# Patient Record
Sex: MALE | Race: WHITE | NOT HISPANIC OR LATINO | URBAN - METROPOLITAN AREA
[De-identification: names, ages, dates, MRNs, and addresses within clinical notes are randomized per-mention and may not be internally consistent; named-entity substitution may affect disease eponyms.]

---

## 2017-02-15 ENCOUNTER — INPATIENT (INPATIENT)
Facility: HOSPITAL | Age: 22
LOS: 3 days | Discharge: HOME | DRG: 440 | End: 2017-02-19
Attending: INTERNAL MEDICINE | Admitting: HOSPITALIST
Payer: COMMERCIAL

## 2017-02-15 VITALS — HEART RATE: 88 BPM | RESPIRATION RATE: 18 BRPM | OXYGEN SATURATION: 98 % | TEMPERATURE: 99 F

## 2017-02-15 DIAGNOSIS — K85.90 ACUTE PANCREATITIS WITHOUT NECROSIS OR INFECTION, UNSPECIFIED: ICD-10-CM

## 2017-02-15 DIAGNOSIS — Z90.49 ACQUIRED ABSENCE OF OTHER SPECIFIED PARTS OF DIGESTIVE TRACT: Chronic | ICD-10-CM

## 2017-02-15 LAB
ALBUMIN SERPL ELPH-MCNC: 4 G/DL — SIGNIFICANT CHANGE UP (ref 3.3–5)
ALP SERPL-CCNC: 178 U/L — HIGH (ref 40–120)
ALT FLD-CCNC: 457 U/L RC — HIGH (ref 10–45)
ANION GAP SERPL CALC-SCNC: 17 MMOL/L — SIGNIFICANT CHANGE UP (ref 5–17)
APPEARANCE UR: CLEAR — SIGNIFICANT CHANGE UP
APTT BLD: 33.3 SEC — SIGNIFICANT CHANGE UP (ref 27.5–37.4)
AST SERPL-CCNC: 182 U/L — HIGH (ref 10–40)
BACTERIA # UR AUTO: ABNORMAL /HPF
BASOPHILS # BLD AUTO: 0.1 K/UL — SIGNIFICANT CHANGE UP (ref 0–0.2)
BASOPHILS NFR BLD AUTO: 0.5 % — SIGNIFICANT CHANGE UP (ref 0–2)
BILIRUB SERPL-MCNC: 2.4 MG/DL — HIGH (ref 0.2–1.2)
BILIRUB UR-MCNC: NEGATIVE — SIGNIFICANT CHANGE UP
BLD GP AB SCN SERPL QL: NEGATIVE — SIGNIFICANT CHANGE UP
BUN SERPL-MCNC: 8 MG/DL — SIGNIFICANT CHANGE UP (ref 7–23)
CALCIUM SERPL-MCNC: 9.5 MG/DL — SIGNIFICANT CHANGE UP (ref 8.4–10.5)
CHLORIDE SERPL-SCNC: 96 MMOL/L — SIGNIFICANT CHANGE UP (ref 96–108)
CO2 SERPL-SCNC: 23 MMOL/L — SIGNIFICANT CHANGE UP (ref 22–31)
COLOR SPEC: YELLOW — SIGNIFICANT CHANGE UP
CREAT SERPL-MCNC: 0.82 MG/DL — SIGNIFICANT CHANGE UP (ref 0.5–1.3)
DIFF PNL FLD: NEGATIVE — SIGNIFICANT CHANGE UP
EOSINOPHIL # BLD AUTO: 0.3 K/UL — SIGNIFICANT CHANGE UP (ref 0–0.5)
EOSINOPHIL NFR BLD AUTO: 2.8 % — SIGNIFICANT CHANGE UP (ref 0–6)
GAS PNL BLDV: SIGNIFICANT CHANGE UP
GLUCOSE SERPL-MCNC: 87 MG/DL — SIGNIFICANT CHANGE UP (ref 70–99)
GLUCOSE UR QL: NEGATIVE — SIGNIFICANT CHANGE UP
HCT VFR BLD CALC: 39.5 % — SIGNIFICANT CHANGE UP (ref 39–50)
HGB BLD-MCNC: 12.8 G/DL — LOW (ref 13–17)
INR BLD: 1.31 RATIO — HIGH (ref 0.88–1.16)
KETONES UR-MCNC: ABNORMAL
LEUKOCYTE ESTERASE UR-ACNC: NEGATIVE — SIGNIFICANT CHANGE UP
LIDOCAIN IGE QN: 968 U/L — HIGH (ref 7–60)
LYMPHOCYTES # BLD AUTO: 1.4 K/UL — SIGNIFICANT CHANGE UP (ref 1–3.3)
LYMPHOCYTES # BLD AUTO: 12.3 % — LOW (ref 13–44)
MCHC RBC-ENTMCNC: 20.8 PG — LOW (ref 27–34)
MCHC RBC-ENTMCNC: 32.5 GM/DL — SIGNIFICANT CHANGE UP (ref 32–36)
MCV RBC AUTO: 64.1 FL — LOW (ref 80–100)
MONOCYTES # BLD AUTO: 1 K/UL — HIGH (ref 0–0.9)
MONOCYTES NFR BLD AUTO: 8.7 % — SIGNIFICANT CHANGE UP (ref 2–14)
NEUTROPHILS # BLD AUTO: 8.8 K/UL — HIGH (ref 1.8–7.4)
NEUTROPHILS NFR BLD AUTO: 75.7 % — SIGNIFICANT CHANGE UP (ref 43–77)
NITRITE UR-MCNC: NEGATIVE — SIGNIFICANT CHANGE UP
PH UR: 6.5 — SIGNIFICANT CHANGE UP (ref 4.8–8)
PLATELET # BLD AUTO: 175 K/UL — SIGNIFICANT CHANGE UP (ref 150–400)
POTASSIUM SERPL-MCNC: 3.9 MMOL/L — SIGNIFICANT CHANGE UP (ref 3.5–5.3)
POTASSIUM SERPL-SCNC: 3.9 MMOL/L — SIGNIFICANT CHANGE UP (ref 3.5–5.3)
PROT SERPL-MCNC: 7.3 G/DL — SIGNIFICANT CHANGE UP (ref 6–8.3)
PROT UR-MCNC: NEGATIVE — SIGNIFICANT CHANGE UP
PROTHROM AB SERPL-ACNC: 14.2 SEC — HIGH (ref 10–13.1)
RBC # BLD: 6.16 M/UL — HIGH (ref 4.2–5.8)
RBC # FLD: 14.4 % — SIGNIFICANT CHANGE UP (ref 10.3–14.5)
RBC CASTS # UR COMP ASSIST: SIGNIFICANT CHANGE UP /HPF (ref 0–2)
RH IG SCN BLD-IMP: NEGATIVE — SIGNIFICANT CHANGE UP
SODIUM SERPL-SCNC: 136 MMOL/L — SIGNIFICANT CHANGE UP (ref 135–145)
SP GR SPEC: >1.03 — HIGH (ref 1.01–1.02)
UROBILINOGEN FLD QL: NEGATIVE — SIGNIFICANT CHANGE UP
WBC # BLD: 11.7 K/UL — HIGH (ref 3.8–10.5)
WBC # FLD AUTO: 11.7 K/UL — HIGH (ref 3.8–10.5)
WBC UR QL: SIGNIFICANT CHANGE UP /HPF (ref 0–5)

## 2017-02-15 PROCEDURE — 99285 EMERGENCY DEPT VISIT HI MDM: CPT

## 2017-02-15 PROCEDURE — 74177 CT ABD & PELVIS W/CONTRAST: CPT | Mod: 26

## 2017-02-15 PROCEDURE — 74182 MRI ABDOMEN W/CONTRAST: CPT | Mod: 26

## 2017-02-15 RX ORDER — MORPHINE SULFATE 50 MG/1
4 CAPSULE, EXTENDED RELEASE ORAL ONCE
Qty: 0 | Refills: 0 | Status: DISCONTINUED | OUTPATIENT
Start: 2017-02-15 | End: 2017-02-15

## 2017-02-15 RX ORDER — SODIUM CHLORIDE 9 MG/ML
1000 INJECTION INTRAMUSCULAR; INTRAVENOUS; SUBCUTANEOUS ONCE
Qty: 0 | Refills: 0 | Status: COMPLETED | OUTPATIENT
Start: 2017-02-15 | End: 2017-02-15

## 2017-02-15 RX ORDER — INFLUENZA VIRUS VACCINE 15; 15; 15; 15 UG/.5ML; UG/.5ML; UG/.5ML; UG/.5ML
0.5 SUSPENSION INTRAMUSCULAR ONCE
Qty: 0 | Refills: 0 | Status: DISCONTINUED | OUTPATIENT
Start: 2017-02-15 | End: 2017-02-19

## 2017-02-15 RX ORDER — ONDANSETRON 8 MG/1
4 TABLET, FILM COATED ORAL ONCE
Qty: 0 | Refills: 0 | Status: COMPLETED | OUTPATIENT
Start: 2017-02-15 | End: 2017-02-15

## 2017-02-15 RX ADMIN — MORPHINE SULFATE 4 MILLIGRAM(S): 50 CAPSULE, EXTENDED RELEASE ORAL at 15:22

## 2017-02-15 RX ADMIN — SODIUM CHLORIDE 2000 MILLILITER(S): 9 INJECTION INTRAMUSCULAR; INTRAVENOUS; SUBCUTANEOUS at 15:22

## 2017-02-15 RX ADMIN — ONDANSETRON 4 MILLIGRAM(S): 8 TABLET, FILM COATED ORAL at 18:02

## 2017-02-15 RX ADMIN — MORPHINE SULFATE 4 MILLIGRAM(S): 50 CAPSULE, EXTENDED RELEASE ORAL at 18:03

## 2017-02-15 RX ADMIN — SODIUM CHLORIDE 2000 MILLILITER(S): 9 INJECTION INTRAMUSCULAR; INTRAVENOUS; SUBCUTANEOUS at 21:07

## 2017-02-15 NOTE — ED ADULT NURSE NOTE - OBJECTIVE STATEMENT
Pt is s/p laparoscopic cholecystectomy on 2/12 and presents for eval of continued epigastric pain and nausea.  Oral mucosa slightly dry, not cracked.  Abd soft with epigastric tenderness.  Denies fevers.  Has 5 laparoscopic insertions sites on abd, each of which is covered with intact Steri-strips.  No redness or drainage noted at any of the sites.

## 2017-02-15 NOTE — ED PROVIDER NOTE - MEDICAL DECISION MAKING DETAILS
Beverly Resident: 22 y/o w/ recent cholecystectomy p/w elevated lipase and persistent abdominal pain - will repeat all labs and obtain MRCP to r/o retained CBD stone and consult surgery - patient currently appears non-toxic - will treat pain and reassess Beverly Resident: 20 y/o w/ recent cholecystectomy p/w elevated lipase and persistent abdominal pain - will repeat all labs and obtain MRCP to r/o retained CBD stone and consult surgery - patient currently appears non-toxic - will treat pain and reassess  MD Christin,Attending: pt juan diego nd examined. agree with above HPI/ROS/PE. as we do not have lipase levels form weekend (beofre and postop) unable to compare "high" level form yesterday. Pain in same place and no worse since surgery. No nausea/vom/fever so doubt cholangtis. ? surgicallli inducde pancreatitic injury vs retained stone +/- stone pancreatitis. Appears well. tender in epigastrium. No peritonism.  For analgesia/labs/MRCP vs CT at discretion of surgery team who will be consulted

## 2017-02-15 NOTE — ED PROVIDER NOTE - PROGRESS NOTE DETAILS
patient pain and nausea significantly improved - CT shows edematous pancreatitis - will continue to hydrate and perform MRCP to r/o retained CBD stone

## 2017-02-15 NOTE — ED PROVIDER NOTE - ABDOMINAL EXAM
soft/tender.../obese - no guarding. Tenderness throughout entire abdomen most severe in epigastric region and right upper quadrant - small laparoscopic incisions convered with bandages w/ no signs of local skin infection/nondistended

## 2017-02-15 NOTE — ED PROVIDER NOTE - OBJECTIVE STATEMENT
20 y/o male with no PMH w/ recent acute cholecystitis on Sunday 22 y/o male with no PMH w/ recent acute cholecystitis on Sunday p/w persistent periumbilical pain and abnormal labs. States the surgery was performed in [place], and had follow up yesterday w/ labs and US, which was inconclusive. Got a call from GI doctor today stating his lipase was very elevated and he should go to the ED. Currently reports abdominal pain and nausea. Denies any f/c, CP, SOB, vomiting, diarrhea, melena, blood in stool, foul smelling stools, dysuria, hematuria, numbness, paresthesias. 22 y/o male with no PMH w/ recent acute cholecystitis on Sunday p/w persistent periumbilical pain and abnormal labs. States the surgery was performed in [place], and had follow up yesterday w/ labs and US, which was inconclusive. Got a call from GI doctor today stating his lipase was very elevated and he should go to the ED. Currently reports abdominal pain and nausea. Denies any f/c, CP, SOB, vomiting, diarrhea, melena, blood in stool, foul smelling stools, dysuria, hematuria, numbness, paresthesias.  GI Doctor: Dr. Sawyer ([facility]) 20 y/o male with no PMH w/ recent acute cholecystitis on Sunday p/w persistent periumbilical pain and abnormal labs. States the surgery was performed in [facility], and had follow up yesterday w/ labs and US, which was inconclusive. Got a call from GI doctor today stating his lipase was very elevated and he should go to the ED. Currently reports abdominal pain and nausea. Denies any f/c, CP, SOB, vomiting, diarrhea, melena, blood in stool, foul smelling stools, dysuria, hematuria, numbness, paresthesias.  Patient states he had a stone in gall bladder neck - had evaluation w/ CT, US, and MRCP to evaluate gall bladder. Patient has lab results from Hospital stay, which does not contain a lipase. Was told that his lipase drawn yesterday was 2,200.  GI Doctor: Dr. Sawyer ([facility] [employer])

## 2017-02-16 DIAGNOSIS — K85.90 ACUTE PANCREATITIS WITHOUT NECROSIS OR INFECTION, UNSPECIFIED: ICD-10-CM

## 2017-02-16 DIAGNOSIS — R74.0 NONSPECIFIC ELEVATION OF LEVELS OF TRANSAMINASE AND LACTIC ACID DEHYDROGENASE [LDH]: ICD-10-CM

## 2017-02-16 DIAGNOSIS — Z41.8 ENCOUNTER FOR OTHER PROCEDURES FOR PURPOSES OTHER THAN REMEDYING HEALTH STATE: ICD-10-CM

## 2017-02-16 LAB
ALBUMIN SERPL ELPH-MCNC: 3.6 G/DL — SIGNIFICANT CHANGE UP (ref 3.3–5)
ALP SERPL-CCNC: 155 U/L — HIGH (ref 40–120)
ALT FLD-CCNC: 388 U/L RC — HIGH (ref 10–45)
AMYLASE P1 CFR SERPL: 769 U/L — HIGH (ref 25–125)
ANION GAP SERPL CALC-SCNC: 18 MMOL/L — HIGH (ref 5–17)
AST SERPL-CCNC: 147 U/L — HIGH (ref 10–40)
BILIRUB DIRECT SERPL-MCNC: 0.9 MG/DL — HIGH (ref 0–0.2)
BILIRUB SERPL-MCNC: 2 MG/DL — HIGH (ref 0.2–1.2)
BUN SERPL-MCNC: 8 MG/DL — SIGNIFICANT CHANGE UP (ref 7–23)
CALCIUM SERPL-MCNC: 9.3 MG/DL — SIGNIFICANT CHANGE UP (ref 8.4–10.5)
CHLORIDE SERPL-SCNC: 99 MMOL/L — SIGNIFICANT CHANGE UP (ref 96–108)
CO2 SERPL-SCNC: 21 MMOL/L — LOW (ref 22–31)
CREAT SERPL-MCNC: 0.72 MG/DL — SIGNIFICANT CHANGE UP (ref 0.5–1.3)
GLUCOSE SERPL-MCNC: 80 MG/DL — SIGNIFICANT CHANGE UP (ref 70–99)
HCT VFR BLD CALC: 36.8 % — LOW (ref 39–50)
HGB BLD-MCNC: 12 G/DL — LOW (ref 13–17)
LIDOCAIN IGE QN: 480 U/L — HIGH (ref 7–60)
MCHC RBC-ENTMCNC: 20.9 PG — LOW (ref 27–34)
MCHC RBC-ENTMCNC: 32.5 GM/DL — SIGNIFICANT CHANGE UP (ref 32–36)
MCV RBC AUTO: 64.3 FL — LOW (ref 80–100)
PLATELET # BLD AUTO: 157 K/UL — SIGNIFICANT CHANGE UP (ref 150–400)
POTASSIUM SERPL-MCNC: 3.7 MMOL/L — SIGNIFICANT CHANGE UP (ref 3.5–5.3)
POTASSIUM SERPL-SCNC: 3.7 MMOL/L — SIGNIFICANT CHANGE UP (ref 3.5–5.3)
PROT SERPL-MCNC: 6.7 G/DL — SIGNIFICANT CHANGE UP (ref 6–8.3)
RBC # BLD: 5.72 M/UL — SIGNIFICANT CHANGE UP (ref 4.2–5.8)
RBC # FLD: 14.3 % — SIGNIFICANT CHANGE UP (ref 10.3–14.5)
SODIUM SERPL-SCNC: 138 MMOL/L — SIGNIFICANT CHANGE UP (ref 135–145)
WBC # BLD: 9.6 K/UL — SIGNIFICANT CHANGE UP (ref 3.8–10.5)
WBC # FLD AUTO: 9.6 K/UL — SIGNIFICANT CHANGE UP (ref 3.8–10.5)

## 2017-02-16 RX ORDER — ONDANSETRON 8 MG/1
4 TABLET, FILM COATED ORAL EVERY 6 HOURS
Qty: 0 | Refills: 0 | Status: DISCONTINUED | OUTPATIENT
Start: 2017-02-16 | End: 2017-02-19

## 2017-02-16 RX ORDER — HEPARIN SODIUM 5000 [USP'U]/ML
5000 INJECTION INTRAVENOUS; SUBCUTANEOUS EVERY 8 HOURS
Qty: 0 | Refills: 0 | Status: DISCONTINUED | OUTPATIENT
Start: 2017-02-16 | End: 2017-02-19

## 2017-02-16 RX ORDER — MORPHINE SULFATE 50 MG/1
4 CAPSULE, EXTENDED RELEASE ORAL ONCE
Qty: 0 | Refills: 0 | Status: DISCONTINUED | OUTPATIENT
Start: 2017-02-16 | End: 2017-02-16

## 2017-02-16 RX ORDER — SODIUM CHLORIDE 9 MG/ML
1000 INJECTION INTRAMUSCULAR; INTRAVENOUS; SUBCUTANEOUS
Qty: 0 | Refills: 0 | Status: DISCONTINUED | OUTPATIENT
Start: 2017-02-16 | End: 2017-02-19

## 2017-02-16 RX ORDER — MORPHINE SULFATE 50 MG/1
4 CAPSULE, EXTENDED RELEASE ORAL EVERY 6 HOURS
Qty: 0 | Refills: 0 | Status: DISCONTINUED | OUTPATIENT
Start: 2017-02-16 | End: 2017-02-19

## 2017-02-16 RX ADMIN — MORPHINE SULFATE 4 MILLIGRAM(S): 50 CAPSULE, EXTENDED RELEASE ORAL at 06:15

## 2017-02-16 RX ADMIN — MORPHINE SULFATE 4 MILLIGRAM(S): 50 CAPSULE, EXTENDED RELEASE ORAL at 05:43

## 2017-02-16 RX ADMIN — SODIUM CHLORIDE 100 MILLILITER(S): 9 INJECTION INTRAMUSCULAR; INTRAVENOUS; SUBCUTANEOUS at 10:33

## 2017-02-16 RX ADMIN — HEPARIN SODIUM 5000 UNIT(S): 5000 INJECTION INTRAVENOUS; SUBCUTANEOUS at 22:27

## 2017-02-16 RX ADMIN — ONDANSETRON 4 MILLIGRAM(S): 8 TABLET, FILM COATED ORAL at 19:47

## 2017-02-16 RX ADMIN — HEPARIN SODIUM 5000 UNIT(S): 5000 INJECTION INTRAVENOUS; SUBCUTANEOUS at 13:22

## 2017-02-16 RX ADMIN — ONDANSETRON 4 MILLIGRAM(S): 8 TABLET, FILM COATED ORAL at 05:43

## 2017-02-16 NOTE — H&P ADULT. - LAB RESULTS AND INTERPRETATION
Blood work personally reviewed - notable for mild leukocytosis on admission, now resolved. CMP notable for transaminitis and mild elevated total bilirubin, now downtrending. Lipase elevated. Blood work and imaging consistent with acute pancreatitis

## 2017-02-16 NOTE — H&P ADULT. - HISTORY OF PRESENT ILLNESS
Patient is a 22 y/o M PMH cholelithiasis, with recent choledocholithiasis s/p cholecystectomy last Sunday at a hospital in [place], presents with pancreatitis. Last week, patient developed acute onset epigastric and RU sharp, intermittent abdominal pain while visiting [place]. He went to a hospital there, was found to have stone blocking the neck of the gallbladder. Stone then dislodged, and patient underwent an uncomplicated cholecystectomy. He was discharged home on Monday afternoon. Shortly thereafter, he developed mild burning epigastric pain. Had nausea with no vomiting yesterday. Also reports several episodes of loose yellow diarrhea in the last two days. Has not taken any antibiotics recently. He saw his GI doctor two days ago, and was called yesterday and told his labs were abnormal. Also told to not eat anything and come to ED. Patient is a 20 y/o M PMH cholelithiasis, with recent choledocholithiasis s/p cholecystectomy last Sunday at a hospital in [place], presents with pancreatitis. Last week, patient developed acute onset epigastric and RU sharp, intermittent abdominal pain while visiting [place]. He went to a hospital there, was found to have stone blocking the neck of the gallbladder. Stone then dislodged, and patient underwent an uncomplicated cholecystectomy. He was discharged home on Monday afternoon. Shortly thereafter, he developed mild burning epigastric pain. Had nausea with no vomiting yesterday. Also reports several episodes of loose yellow diarrhea in the last two days. Has not taken any antibiotics recently. He saw his GI doctor two days ago, and was called yesterday and told his labs were abnormal. Also told to not eat anything and come to ED. In ED, found to have a temperature of 100.5F. Patient admitted for management of acute pancreatitis.

## 2017-02-16 NOTE — H&P ADULT. - FAMILY HISTORY
Grandparent  Still living? Unknown  Family history of gallstones, Age at diagnosis: Age Unknown  Family history of diabetes mellitus, Age at diagnosis: Age Unknown

## 2017-02-16 NOTE — H&P ADULT. - ASSESSMENT
20 y/o M PMH cholelithiasis with recent choledocholithiasis s/p cholecystectomy, presents with acute pancreatitis

## 2017-02-16 NOTE — H&P ADULT. - PROBLEM SELECTOR PLAN 1
- surgery recommendations appreciated  - awaiting GI evaluation  - NPO  - IVF   - pending GI evaluation, will advance diet as tolerated  - trend LFTs

## 2017-02-17 LAB
ALBUMIN SERPL ELPH-MCNC: 3.6 G/DL — SIGNIFICANT CHANGE UP (ref 3.3–5)
ALP SERPL-CCNC: 137 U/L — HIGH (ref 40–120)
ALT FLD-CCNC: 321 U/L RC — HIGH (ref 10–45)
AMYLASE P1 CFR SERPL: 707 U/L — HIGH (ref 25–125)
ANION GAP SERPL CALC-SCNC: 16 MMOL/L — SIGNIFICANT CHANGE UP (ref 5–17)
AST SERPL-CCNC: 100 U/L — HIGH (ref 10–40)
BILIRUB DIRECT SERPL-MCNC: 0.7 MG/DL — HIGH (ref 0–0.2)
BILIRUB SERPL-MCNC: 1.5 MG/DL — HIGH (ref 0.2–1.2)
BUN SERPL-MCNC: 8 MG/DL — SIGNIFICANT CHANGE UP (ref 7–23)
CALCIUM SERPL-MCNC: 9.5 MG/DL — SIGNIFICANT CHANGE UP (ref 8.4–10.5)
CHLORIDE SERPL-SCNC: 100 MMOL/L — SIGNIFICANT CHANGE UP (ref 96–108)
CO2 SERPL-SCNC: 22 MMOL/L — SIGNIFICANT CHANGE UP (ref 22–31)
CREAT SERPL-MCNC: 0.64 MG/DL — SIGNIFICANT CHANGE UP (ref 0.5–1.3)
GLUCOSE SERPL-MCNC: 77 MG/DL — SIGNIFICANT CHANGE UP (ref 70–99)
HCT VFR BLD CALC: 36.8 % — LOW (ref 39–50)
HGB BLD-MCNC: 12.1 G/DL — LOW (ref 13–17)
LIDOCAIN IGE QN: 1010 U/L — HIGH (ref 7–60)
MCHC RBC-ENTMCNC: 21.1 PG — LOW (ref 27–34)
MCHC RBC-ENTMCNC: 32.8 GM/DL — SIGNIFICANT CHANGE UP (ref 32–36)
MCV RBC AUTO: 64.2 FL — LOW (ref 80–100)
PLATELET # BLD AUTO: 155 K/UL — SIGNIFICANT CHANGE UP (ref 150–400)
POTASSIUM SERPL-MCNC: 3.5 MMOL/L — SIGNIFICANT CHANGE UP (ref 3.5–5.3)
POTASSIUM SERPL-SCNC: 3.5 MMOL/L — SIGNIFICANT CHANGE UP (ref 3.5–5.3)
PROT SERPL-MCNC: 6.5 G/DL — SIGNIFICANT CHANGE UP (ref 6–8.3)
RBC # BLD: 5.73 M/UL — SIGNIFICANT CHANGE UP (ref 4.2–5.8)
RBC # FLD: 14.7 % — HIGH (ref 10.3–14.5)
SODIUM SERPL-SCNC: 138 MMOL/L — SIGNIFICANT CHANGE UP (ref 135–145)
WBC # BLD: 7.3 K/UL — SIGNIFICANT CHANGE UP (ref 3.8–10.5)
WBC # FLD AUTO: 7.3 K/UL — SIGNIFICANT CHANGE UP (ref 3.8–10.5)

## 2017-02-17 RX ADMIN — HEPARIN SODIUM 5000 UNIT(S): 5000 INJECTION INTRAVENOUS; SUBCUTANEOUS at 05:58

## 2017-02-17 RX ADMIN — HEPARIN SODIUM 5000 UNIT(S): 5000 INJECTION INTRAVENOUS; SUBCUTANEOUS at 16:04

## 2017-02-17 RX ADMIN — HEPARIN SODIUM 5000 UNIT(S): 5000 INJECTION INTRAVENOUS; SUBCUTANEOUS at 22:11

## 2017-02-17 RX ADMIN — SODIUM CHLORIDE 100 MILLILITER(S): 9 INJECTION INTRAMUSCULAR; INTRAVENOUS; SUBCUTANEOUS at 16:04

## 2017-02-17 RX ADMIN — SODIUM CHLORIDE 100 MILLILITER(S): 9 INJECTION INTRAMUSCULAR; INTRAVENOUS; SUBCUTANEOUS at 05:57

## 2017-02-17 NOTE — DISCHARGE NOTE ADULT - CARE PROVIDER_API CALL
Yinka Sawyer), Gastroenterology; Internal Medicine  [address]  [place] [address]  Phone: [phone]  Fax: [phone]

## 2017-02-17 NOTE — DISCHARGE NOTE ADULT - PATIENT PORTAL LINK FT
“You can access the [facility] Patient Portal, offered by [facility], by registering with the following website: [email or web address]”

## 2017-02-17 NOTE — DISCHARGE NOTE ADULT - ADDITIONAL INSTRUCTIONS
Please follow up with your gastroenterologist within one week discharge. Please follow up with your gastroenterologist within one week discharge. You will need a follow up MRI of your abdomen on discharge.

## 2017-02-17 NOTE — DISCHARGE NOTE ADULT - CARE PLAN
Principal Discharge DX:	Acute pancreatitis  Goal:	Resolution of symptoms  Instructions for follow-up, activity and diet:	Please follow up with your gastroenterologist on discharge.  Secondary Diagnosis:	Transaminitis  Instructions for follow-up, activity and diet:	Your liver numbers were elevated, likely due to acute pancreatitis. They improved during your admission. Principal Discharge DX:	Acute pancreatitis  Goal:	Resolution of symptoms  Instructions for follow-up, activity and diet:	Please follow up with your gastroenterologist within one week of discharge. You will need an MRI of your abdomen as an outpatient.  Secondary Diagnosis:	Transaminitis  Instructions for follow-up, activity and diet:	Your liver numbers were elevated, likely due to acute pancreatitis. They improved during your admission.

## 2017-02-17 NOTE — DISCHARGE NOTE ADULT - PLAN OF CARE
Resolution of symptoms Please follow up with your gastroenterologist on discharge. Your liver numbers were elevated, likely due to acute pancreatitis. They improved during your admission. Please follow up with your gastroenterologist within one week of discharge. You will need an MRI of your abdomen as an outpatient.

## 2017-02-17 NOTE — DISCHARGE NOTE ADULT - HOSPITAL COURSE
Patient is a 20 y/o M PMH cholelithiasis, with recent choledocholithiasis s/p cholecystectomy last Sunday at a hospital in [place], presents with pancreatitis. Last week, patient developed acute onset epigastric and RU sharp, intermittent abdominal pain while visiting [place]. He went to a hospital there, was found to have stone blocking the neck of the gallbladder. Stone then dislodged, and patient underwent an uncomplicated cholecystectomy. He was discharged home on Monday afternoon. Shortly thereafter, he developed mild burning epigastric pain. Had nausea with no vomiting yesterday. Also reports several episodes of loose yellow diarrhea in the last two days. Has not taken any antibiotics recently. He saw his GI doctor two days ago, and was called yesterday and told his labs were abnormal. Also told to not eat anything and come to ED. In ED, found to have a temperature of 100.5F. Patient admitted for management of acute pancreatitis. Found to have interstitial edematous pancreatitis on CT a/p and MRCP.    Patient was made NPO and started on IV fluids. He was evaluated by GI who recommended no acute intervention. He was also seen by surgery, who recommended no surgical intervention. Patient's pain was controlled and diet was advanced as tolerated. LFT's were elevated on admission and trended down. On day of discharge, patient remains hemodynamically stable. He is to follow up with his gastroenterologist on discharge. Patient is a 22 y/o M PMH cholelithiasis, with recent choledocholithiasis s/p cholecystectomy last Sunday at a hospital in [place], presents with pancreatitis. Last week, patient developed acute onset epigastric and RU sharp, intermittent abdominal pain while visiting [place]. He went to a hospital there, was found to have stone blocking the neck of the gallbladder. Stone then dislodged, and patient underwent an uncomplicated cholecystectomy. He was discharged home on Monday afternoon. Shortly thereafter, he developed mild burning epigastric pain. Had nausea with no vomiting yesterday. Also reports several episodes of loose yellow diarrhea in the last two days. Has not taken any antibiotics recently. He saw his GI doctor two days ago, and was called yesterday and told his labs were abnormal. Also told to not eat anything and come to ED. In ED, found to have a temperature of 100.5F. Patient admitted for management of acute pancreatitis. Found to have interstitial edematous pancreatitis on CT a/p and MRCP.    Patient was made NPO and started on IV fluids. He was evaluated by GI who recommended no acute intervention. He was also seen by surgery, who recommended no surgical intervention. Patient's pain was controlled and diet was advanced as tolerated. LFT's were elevated on admission and trended down. However, amylase and lipase trended up, and then began to trend down. Surgery recommended repeat MRCP to evaluate biliary system when amylase/lipase trended up, however patient remained asymptomatic and as amylase/lipase began to trend down, patient is to obtain MRCP as outpatient as per conversation with his gastroenterologist.  On day of discharge, patient remains hemodynamically stable. He is to follow up with his gastroenterologist on discharge for repeat MRCP. Patient feels well on discharge with no symptoms.

## 2017-02-18 LAB
ALBUMIN SERPL ELPH-MCNC: 3.6 G/DL — SIGNIFICANT CHANGE UP (ref 3.3–5)
ALP SERPL-CCNC: 128 U/L — HIGH (ref 40–120)
ALT FLD-CCNC: 247 U/L RC — HIGH (ref 10–45)
AMYLASE P1 CFR SERPL: 774 U/L — HIGH (ref 25–125)
AMYLASE P1 CFR SERPL: 997 U/L — HIGH (ref 25–125)
ANION GAP SERPL CALC-SCNC: 16 MMOL/L — SIGNIFICANT CHANGE UP (ref 5–17)
AST SERPL-CCNC: 61 U/L — HIGH (ref 10–40)
BILIRUB SERPL-MCNC: 1.2 MG/DL — SIGNIFICANT CHANGE UP (ref 0.2–1.2)
BUN SERPL-MCNC: 8 MG/DL — SIGNIFICANT CHANGE UP (ref 7–23)
CALCIUM SERPL-MCNC: 9.1 MG/DL — SIGNIFICANT CHANGE UP (ref 8.4–10.5)
CHLORIDE SERPL-SCNC: 101 MMOL/L — SIGNIFICANT CHANGE UP (ref 96–108)
CO2 SERPL-SCNC: 24 MMOL/L — SIGNIFICANT CHANGE UP (ref 22–31)
CREAT SERPL-MCNC: 0.68 MG/DL — SIGNIFICANT CHANGE UP (ref 0.5–1.3)
GLUCOSE SERPL-MCNC: 92 MG/DL — SIGNIFICANT CHANGE UP (ref 70–99)
LIDOCAIN IGE QN: 1140 U/L — HIGH (ref 7–60)
LIDOCAIN IGE QN: 1548 U/L — HIGH (ref 7–60)
MAGNESIUM SERPL-MCNC: 1.9 MG/DL — SIGNIFICANT CHANGE UP (ref 1.6–2.6)
PHOSPHATE SERPL-MCNC: 4.2 MG/DL — SIGNIFICANT CHANGE UP (ref 2.5–4.5)
POTASSIUM SERPL-MCNC: 3.6 MMOL/L — SIGNIFICANT CHANGE UP (ref 3.5–5.3)
POTASSIUM SERPL-SCNC: 3.6 MMOL/L — SIGNIFICANT CHANGE UP (ref 3.5–5.3)
PROT SERPL-MCNC: 6.5 G/DL — SIGNIFICANT CHANGE UP (ref 6–8.3)
SODIUM SERPL-SCNC: 141 MMOL/L — SIGNIFICANT CHANGE UP (ref 135–145)

## 2017-02-18 RX ADMIN — HEPARIN SODIUM 5000 UNIT(S): 5000 INJECTION INTRAVENOUS; SUBCUTANEOUS at 14:03

## 2017-02-18 RX ADMIN — HEPARIN SODIUM 5000 UNIT(S): 5000 INJECTION INTRAVENOUS; SUBCUTANEOUS at 21:07

## 2017-02-18 RX ADMIN — SODIUM CHLORIDE 150 MILLILITER(S): 9 INJECTION INTRAMUSCULAR; INTRAVENOUS; SUBCUTANEOUS at 14:06

## 2017-02-18 RX ADMIN — SODIUM CHLORIDE 100 MILLILITER(S): 9 INJECTION INTRAMUSCULAR; INTRAVENOUS; SUBCUTANEOUS at 06:15

## 2017-02-18 RX ADMIN — HEPARIN SODIUM 5000 UNIT(S): 5000 INJECTION INTRAVENOUS; SUBCUTANEOUS at 06:16

## 2017-02-19 VITALS
TEMPERATURE: 98 F | OXYGEN SATURATION: 99 % | RESPIRATION RATE: 16 BRPM | SYSTOLIC BLOOD PRESSURE: 138 MMHG | DIASTOLIC BLOOD PRESSURE: 83 MMHG | HEART RATE: 60 BPM

## 2017-02-19 LAB
ALBUMIN SERPL ELPH-MCNC: 3.6 G/DL — SIGNIFICANT CHANGE UP (ref 3.3–5)
ALP SERPL-CCNC: 116 U/L — SIGNIFICANT CHANGE UP (ref 40–120)
ALT FLD-CCNC: 196 U/L RC — HIGH (ref 10–45)
AMYLASE P1 CFR SERPL: 740 U/L — HIGH (ref 25–125)
ANION GAP SERPL CALC-SCNC: 13 MMOL/L — SIGNIFICANT CHANGE UP (ref 5–17)
AST SERPL-CCNC: 51 U/L — HIGH (ref 10–40)
BILIRUB SERPL-MCNC: 1.1 MG/DL — SIGNIFICANT CHANGE UP (ref 0.2–1.2)
BUN SERPL-MCNC: 7 MG/DL — SIGNIFICANT CHANGE UP (ref 7–23)
CALCIUM SERPL-MCNC: 8.9 MG/DL — SIGNIFICANT CHANGE UP (ref 8.4–10.5)
CHLORIDE SERPL-SCNC: 104 MMOL/L — SIGNIFICANT CHANGE UP (ref 96–108)
CO2 SERPL-SCNC: 25 MMOL/L — SIGNIFICANT CHANGE UP (ref 22–31)
CREAT SERPL-MCNC: 0.65 MG/DL — SIGNIFICANT CHANGE UP (ref 0.5–1.3)
GLUCOSE SERPL-MCNC: 98 MG/DL — SIGNIFICANT CHANGE UP (ref 70–99)
LIDOCAIN IGE QN: 900 U/L — HIGH (ref 7–60)
POTASSIUM SERPL-MCNC: 3.7 MMOL/L — SIGNIFICANT CHANGE UP (ref 3.5–5.3)
POTASSIUM SERPL-SCNC: 3.7 MMOL/L — SIGNIFICANT CHANGE UP (ref 3.5–5.3)
PROT SERPL-MCNC: 6.5 G/DL — SIGNIFICANT CHANGE UP (ref 6–8.3)
SODIUM SERPL-SCNC: 142 MMOL/L — SIGNIFICANT CHANGE UP (ref 135–145)

## 2017-02-19 PROCEDURE — 85027 COMPLETE CBC AUTOMATED: CPT

## 2017-02-19 PROCEDURE — 96375 TX/PRO/DX INJ NEW DRUG ADDON: CPT | Mod: XU

## 2017-02-19 PROCEDURE — 86850 RBC ANTIBODY SCREEN: CPT

## 2017-02-19 PROCEDURE — 84100 ASSAY OF PHOSPHORUS: CPT

## 2017-02-19 PROCEDURE — 83690 ASSAY OF LIPASE: CPT

## 2017-02-19 PROCEDURE — 86901 BLOOD TYPING SEROLOGIC RH(D): CPT

## 2017-02-19 PROCEDURE — 82435 ASSAY OF BLOOD CHLORIDE: CPT

## 2017-02-19 PROCEDURE — 86900 BLOOD TYPING SEROLOGIC ABO: CPT

## 2017-02-19 PROCEDURE — 83605 ASSAY OF LACTIC ACID: CPT

## 2017-02-19 PROCEDURE — 99285 EMERGENCY DEPT VISIT HI MDM: CPT | Mod: 25

## 2017-02-19 PROCEDURE — 74177 CT ABD & PELVIS W/CONTRAST: CPT

## 2017-02-19 PROCEDURE — 82330 ASSAY OF CALCIUM: CPT

## 2017-02-19 PROCEDURE — 96374 THER/PROPH/DIAG INJ IV PUSH: CPT | Mod: XU

## 2017-02-19 PROCEDURE — 85014 HEMATOCRIT: CPT

## 2017-02-19 PROCEDURE — 84132 ASSAY OF SERUM POTASSIUM: CPT

## 2017-02-19 PROCEDURE — 82947 ASSAY GLUCOSE BLOOD QUANT: CPT

## 2017-02-19 PROCEDURE — 80053 COMPREHEN METABOLIC PANEL: CPT

## 2017-02-19 PROCEDURE — 82803 BLOOD GASES ANY COMBINATION: CPT

## 2017-02-19 PROCEDURE — 85730 THROMBOPLASTIN TIME PARTIAL: CPT

## 2017-02-19 PROCEDURE — 83735 ASSAY OF MAGNESIUM: CPT

## 2017-02-19 PROCEDURE — 81001 URINALYSIS AUTO W/SCOPE: CPT

## 2017-02-19 PROCEDURE — 85610 PROTHROMBIN TIME: CPT

## 2017-02-19 PROCEDURE — 74182 MRI ABDOMEN W/CONTRAST: CPT

## 2017-02-19 PROCEDURE — 87040 BLOOD CULTURE FOR BACTERIA: CPT

## 2017-02-19 PROCEDURE — 84295 ASSAY OF SERUM SODIUM: CPT

## 2017-02-19 PROCEDURE — 82248 BILIRUBIN DIRECT: CPT

## 2017-02-19 PROCEDURE — 82150 ASSAY OF AMYLASE: CPT

## 2017-02-19 RX ADMIN — SODIUM CHLORIDE 150 MILLILITER(S): 9 INJECTION INTRAMUSCULAR; INTRAVENOUS; SUBCUTANEOUS at 06:48

## 2017-02-19 RX ADMIN — SODIUM CHLORIDE 150 MILLILITER(S): 9 INJECTION INTRAMUSCULAR; INTRAVENOUS; SUBCUTANEOUS at 00:30

## 2017-02-19 RX ADMIN — HEPARIN SODIUM 5000 UNIT(S): 5000 INJECTION INTRAVENOUS; SUBCUTANEOUS at 06:48

## 2017-02-22 LAB
CULTURE RESULTS: SIGNIFICANT CHANGE UP
SPECIMEN SOURCE: SIGNIFICANT CHANGE UP

## 2017-03-07 ENCOUNTER — OUTPATIENT (OUTPATIENT)
Dept: OUTPATIENT SERVICES | Facility: HOSPITAL | Age: 22
LOS: 1 days | End: 2017-03-07
Payer: COMMERCIAL

## 2017-03-07 DIAGNOSIS — Z90.49 ACQUIRED ABSENCE OF OTHER SPECIFIED PARTS OF DIGESTIVE TRACT: ICD-10-CM

## 2017-03-07 DIAGNOSIS — R74.0 NONSPECIFIC ELEVATION OF LEVELS OF TRANSAMINASE AND LACTIC ACID DEHYDROGENASE [LDH]: ICD-10-CM

## 2017-03-07 DIAGNOSIS — Z90.49 ACQUIRED ABSENCE OF OTHER SPECIFIED PARTS OF DIGESTIVE TRACT: Chronic | ICD-10-CM

## 2017-03-07 PROCEDURE — 88305 TISSUE EXAM BY PATHOLOGIST: CPT

## 2017-03-07 PROCEDURE — 88312 SPECIAL STAINS GROUP 1: CPT | Mod: 26

## 2017-03-07 PROCEDURE — 88305 TISSUE EXAM BY PATHOLOGIST: CPT | Mod: 26

## 2017-03-07 PROCEDURE — 43239 EGD BIOPSY SINGLE/MULTIPLE: CPT | Mod: XS

## 2017-03-07 PROCEDURE — 43259 EGD US EXAM DUODENUM/JEJUNUM: CPT

## 2017-03-07 PROCEDURE — 88312 SPECIAL STAINS GROUP 1: CPT

## 2017-03-08 LAB — SURGICAL PATHOLOGY STUDY: SIGNIFICANT CHANGE UP

## 2023-04-27 PROBLEM — Z00.00 ENCOUNTER FOR PREVENTIVE HEALTH EXAMINATION: Status: ACTIVE | Noted: 2023-04-27
